# Patient Record
Sex: MALE | Race: WHITE | NOT HISPANIC OR LATINO
[De-identification: names, ages, dates, MRNs, and addresses within clinical notes are randomized per-mention and may not be internally consistent; named-entity substitution may affect disease eponyms.]

---

## 2023-02-07 PROBLEM — Z00.00 ENCOUNTER FOR PREVENTIVE HEALTH EXAMINATION: Status: ACTIVE | Noted: 2023-02-07

## 2023-02-10 ENCOUNTER — APPOINTMENT (OUTPATIENT)
Dept: RADIATION ONCOLOGY | Facility: CLINIC | Age: 73
End: 2023-02-10
Payer: MEDICARE

## 2023-02-10 ENCOUNTER — NON-APPOINTMENT (OUTPATIENT)
Age: 73
End: 2023-02-10

## 2023-02-10 VITALS
RESPIRATION RATE: 19 BRPM | OXYGEN SATURATION: 95 % | WEIGHT: 233 LBS | HEART RATE: 70 BPM | SYSTOLIC BLOOD PRESSURE: 132 MMHG | DIASTOLIC BLOOD PRESSURE: 74 MMHG

## 2023-02-10 DIAGNOSIS — Z87.438 PERSONAL HISTORY OF OTHER DISEASES OF MALE GENITAL ORGANS: ICD-10-CM

## 2023-02-10 DIAGNOSIS — Z80.8 FAMILY HISTORY OF MALIGNANT NEOPLASM OF OTHER ORGANS OR SYSTEMS: ICD-10-CM

## 2023-02-10 DIAGNOSIS — I10 ESSENTIAL (PRIMARY) HYPERTENSION: ICD-10-CM

## 2023-02-10 DIAGNOSIS — R97.20 ELEVATED PROSTATE, SPECIFIC ANTIGEN [PSA]: ICD-10-CM

## 2023-02-10 DIAGNOSIS — C61 MALIGNANT NEOPLASM OF PROSTATE: ICD-10-CM

## 2023-02-10 DIAGNOSIS — E78.00 PURE HYPERCHOLESTEROLEMIA, UNSPECIFIED: ICD-10-CM

## 2023-02-10 PROCEDURE — 99204 OFFICE O/P NEW MOD 45 MIN: CPT | Mod: 25

## 2023-02-10 RX ORDER — HYDROCHLOROTHIAZIDE 12.5 MG/1
12.5 CAPSULE ORAL
Refills: 0 | Status: ACTIVE | COMMUNITY

## 2023-02-10 RX ORDER — AMLODIPINE BESYLATE 10 MG/1
10 TABLET ORAL
Refills: 0 | Status: ACTIVE | COMMUNITY

## 2023-02-10 RX ORDER — SILODOSIN 8 MG/1
8 CAPSULE ORAL
Refills: 0 | Status: ACTIVE | COMMUNITY

## 2023-02-10 NOTE — PHYSICAL EXAM
[Normal] : oriented to person, place and time, the affect was normal, the mood was normal and not anxious [de-identified] : Large midline ventral abdominal hernia [FreeTextEntry1] : Declined by patietn

## 2023-02-10 NOTE — HISTORY OF PRESENT ILLNESS
[FreeTextEntry1] : This is a 72 year old male diagnosed with prostate cancer in September 2022 referred by Dr. Lui. \par \par Elevated PSA in February 2019 noted to be 5.49 ng/mL. Prostate MRI performed on 2/5/19 showed a 66 cc. Lesion noted 10 x 5 mm right posteromedial midgland peripheral zone lesion. Overlying capsular irregularity and bulging likely representing extra capsular extension. PI-RADS 4. Also seen was a 10 x 9 mm right anterior base (extending to midgland) transition zone lesion. Abuts the capsule but not extracapsular extension.  PI-RADS 3. No evidence of seminal vesicle invasion.No new or enlarging pelvic lymph nodes. Large umbilical hernia containing fat and non obstructed small bowel loops with 6 cm neck. \par \par Patient refused prostate biopsy at this time. He was started on Rapaflo. \par \par PSA on 2/25/21 was 10.10 ng/mL\par PSA on 3/10/22 was 15.1 ng/mL\par \par Prostate biopsy performed on 9/6/22 showed adenocarcinoma in 1 of 11 cores. [place] score of 3+4=7. Maximum core involvement 50%. \par \par PSA on 10/3/22 was 14.4 ng/mL.\par \par CT A/P performed on 10/11/22 showed a heterogenous, enlarged prostate gland with nodular indentation of the bladder trigone. No abdominal or pelvic lymphadenopathy. Large supraumbilical hernia containing unobstructed small bowel and mesentery. Stable right adrenal nodule, unchanged since 2015 and presumably a benign adrenal adenoma. Mild mesenteric and retroperitoneal fat stranding, nonspecific but unchanged since 2015 and presumably benign. \par \par Urinary frequency and urgency noted. Denies hematuria and dysuria. He continues to take Rapaflo. His EPIC score is 18.  PSA drawn today.  He presents to discuss the role of radiation therapy in his care.

## 2023-02-10 NOTE — DISEASE MANAGEMENT
[3] : T3 [0] : M0 [0-10] : 0 -10 ng/mL [Biopsy] : Patient had a biopsy on [7(3+4)] : Template Biopsy Galt Score: 7(3+4) [] : Patient had a Prostate MRI [4] : 4 [Extracapsular Extension] : Extracapsular extension [IIIB] : IIIB [BiopsyDate] : 09/22 [TotalCores] : 11 [TotalPositiveCores] : 1 [CTresults] : negative

## 2023-02-10 NOTE — VITALS
[Maximal Pain Intensity: 0/10] : 0/10 [90: Able to carry normal activity; minor signs or symptoms of disease.] : 90: Able to carry normal activity; minor signs or symptoms of disease.  [Date: ____________] : Patient's last distress assessment performed on [unfilled]. [3 - Distress Level] : Distress Level: 3 [Referred Patient  to social work for follow-up] : Patient was referred to social work for follow-up [FreeTextEntry7] : Patient very tearful during consult.

## 2023-02-13 LAB — PSA SERPL-MCNC: 14.4 NG/ML

## 2023-02-17 PROCEDURE — 77332 RADIATION TREATMENT AID(S): CPT

## 2023-02-17 PROCEDURE — 77263 THER RADIOLOGY TX PLNG CPLX: CPT

## 2023-02-24 PROCEDURE — 77301 RADIOTHERAPY DOSE PLAN IMRT: CPT

## 2023-02-24 PROCEDURE — 77338 DESIGN MLC DEVICE FOR IMRT: CPT

## 2023-02-24 PROCEDURE — 77300 RADIATION THERAPY DOSE PLAN: CPT

## 2023-04-24 ENCOUNTER — NON-APPOINTMENT (OUTPATIENT)
Age: 73
End: 2023-04-24

## 2023-04-24 VITALS — OXYGEN SATURATION: 97 % | RESPIRATION RATE: 18 BRPM | WEIGHT: 240 LBS | HEART RATE: 64 BPM

## 2023-04-24 PROCEDURE — 77014: CPT

## 2023-04-24 PROCEDURE — 77427 RADIATION TX MANAGEMENT X5: CPT

## 2023-04-24 PROCEDURE — G6015: CPT

## 2023-04-24 NOTE — DISEASE MANAGEMENT
[Pathological] : TNM Stage: p [IIIB] : IIIB [TTNM] : 3 [NTNM] : 0 [MTNM] : 0 [de-identified] : 250 [de-identified] : 8306 [de-identified] : Prostate/ SV

## 2023-04-24 NOTE — HISTORY OF PRESENT ILLNESS
[FreeTextEntry1] : This is a 72 year old male diagnosed with prostate cancer in September 2022 referred by Dr. Lui. \par \par Elevated PSA in February 2019 noted to be 5.49 ng/mL. Prostate MRI performed on 2/5/19 showed a 66 cc. Lesion noted 10 x 5 mm right posteromedial midgland peripheral zone lesion. Overlying capsular irregularity and bulging likely representing extra capsular extension. PI-RADS 4. Also seen was a 10 x 9 mm right anterior base (extending to midgland) transition zone lesion. Abuts the capsule but not extracapsular extension.  PI-RADS 3. No evidence of seminal vesicle invasion.No new or enlarging pelvic lymph nodes. Large umbilical hernia containing fat and non obstructed small bowel loops with 6 cm neck. \par \par Patient refused prostate biopsy at this time. He was started on Rapaflo. \par \par PSA on 2/25/21 was 10.10 ng/mL\par PSA on 3/10/22 was 15.1 ng/mL\par \par Prostate biopsy performed on 9/6/22 showed adenocarcinoma in 1 of 11 cores. [place] score of 3+4=7. Maximum core involvement 50%. \par \par PSA on 10/3/22 was 14.4 ng/mL.\par \par CT A/P performed on 10/11/22 showed a heterogenous, enlarged prostate gland with nodular indentation of the bladder trigone. No abdominal or pelvic lymphadenopathy. Large supraumbilical hernia containing unobstructed small bowel and mesentery. Stable right adrenal nodule, unchanged since 2015 and presumably a benign adrenal adenoma. Mild mesenteric and retroperitoneal fat stranding, nonspecific but unchanged since 2015 and presumably benign. \par \par Urinary frequency and urgency noted. Denies hematuria and dysuria. He continues to take Rapaflo. His EPIC score is 18. \par \par He is currently undergoing RT to the prostate/SV.\par \par He presents for an OTV 1/28 fx. Feeling well.

## 2023-04-25 PROCEDURE — G6015: CPT

## 2023-04-25 PROCEDURE — 77014: CPT

## 2023-04-26 PROCEDURE — G6015: CPT

## 2023-04-26 PROCEDURE — 77014: CPT

## 2023-04-27 PROCEDURE — 77014: CPT

## 2023-04-27 PROCEDURE — G6015: CPT

## 2023-04-28 PROCEDURE — 77014: CPT

## 2023-04-28 PROCEDURE — 77336 RADIATION PHYSICS CONSULT: CPT

## 2023-04-28 PROCEDURE — G6015: CPT

## 2023-05-01 ENCOUNTER — NON-APPOINTMENT (OUTPATIENT)
Age: 73
End: 2023-05-01

## 2023-05-01 VITALS — HEART RATE: 67 BPM | WEIGHT: 239 LBS | OXYGEN SATURATION: 97 % | RESPIRATION RATE: 18 BRPM

## 2023-05-01 PROCEDURE — 77427 RADIATION TX MANAGEMENT X5: CPT

## 2023-05-01 PROCEDURE — G6015: CPT

## 2023-05-01 PROCEDURE — 77014: CPT

## 2023-05-02 PROCEDURE — G6015: CPT

## 2023-05-02 PROCEDURE — 77014: CPT

## 2023-05-03 ENCOUNTER — NON-APPOINTMENT (OUTPATIENT)
Age: 73
End: 2023-05-03

## 2023-05-03 PROCEDURE — G6002: CPT

## 2023-05-03 PROCEDURE — G6015: CPT

## 2023-05-03 NOTE — HISTORY OF PRESENT ILLNESS
[FreeTextEntry1] : This is a 72 year old male diagnosed with prostate cancer in September 2022 referred by Dr. Lui. \par \par Elevated PSA in February 2019 noted to be 5.49 ng/mL. Prostate MRI performed on 2/5/19 showed a 66 cc. Lesion noted 10 x 5 mm right posteromedial midgland peripheral zone lesion. Overlying capsular irregularity and bulging likely representing extra capsular extension. PI-RADS 4. Also seen was a 10 x 9 mm right anterior base (extending to midgland) transition zone lesion. Abuts the capsule but not extracapsular extension.  PI-RADS 3. No evidence of seminal vesicle invasion.No new or enlarging pelvic lymph nodes. Large umbilical hernia containing fat and non obstructed small bowel loops with 6 cm neck. \par \par Patient refused prostate biopsy at this time. He was started on Rapaflo. \par \par PSA on 2/25/21 was 10.10 ng/mL\par PSA on 3/10/22 was 15.1 ng/mL\par \par Prostate biopsy performed on 9/6/22 showed adenocarcinoma in 1 of 11 cores. [place] score of 3+4=7. Maximum core involvement 50%. \par \par PSA on 10/3/22 was 14.4 ng/mL.\par \par CT A/P performed on 10/11/22 showed a heterogenous, enlarged prostate gland with nodular indentation of the bladder trigone. No abdominal or pelvic lymphadenopathy. Large supraumbilical hernia containing unobstructed small bowel and mesentery. Stable right adrenal nodule, unchanged since 2015 and presumably a benign adrenal adenoma. Mild mesenteric and retroperitoneal fat stranding, nonspecific but unchanged since 2015 and presumably benign. \par \par Urinary frequency and urgency noted. Denies hematuria and dysuria. He continues to take Rapaflo. His EPIC score is 18. \par \par He is currently undergoing RT to the prostate/SV.\par \par He presents for an OTV 6/28 fx. Feeling well. Denies any current symptoms.

## 2023-05-03 NOTE — DISEASE MANAGEMENT
[TTNM] : 3 [NTNM] : 0 [MTNM] : 0 [de-identified] : 3669 [de-identified] : 4766 [de-identified] : Prostate/ SV

## 2023-05-04 PROCEDURE — 77014: CPT

## 2023-05-04 PROCEDURE — G6015: CPT

## 2023-05-05 PROCEDURE — G6015: CPT

## 2023-05-05 PROCEDURE — 77014: CPT

## 2023-05-08 ENCOUNTER — NON-APPOINTMENT (OUTPATIENT)
Age: 73
End: 2023-05-08

## 2023-05-08 VITALS — WEIGHT: 243 LBS | RESPIRATION RATE: 19 BRPM | OXYGEN SATURATION: 98 % | HEART RATE: 70 BPM

## 2023-05-08 PROCEDURE — G6015: CPT

## 2023-05-08 PROCEDURE — G6002: CPT

## 2023-05-08 PROCEDURE — 77427 RADIATION TX MANAGEMENT X5: CPT

## 2023-05-08 NOTE — DISEASE MANAGEMENT
[Pathological] : TNM Stage: p [IIIB] : IIIB [TTNM] : 3 [NTNM] : 0 [MTNM] : 0 [de-identified] : 9295 [de-identified] : 1765 [de-identified] : Prostate/ SV

## 2023-05-08 NOTE — HISTORY OF PRESENT ILLNESS
[FreeTextEntry1] : This is a 72 year old male diagnosed with prostate cancer in September 2022 referred by Dr. Lui. \par \par Elevated PSA in February 2019 noted to be 5.49 ng/mL. Prostate MRI performed on 2/5/19 showed a 66 cc. Lesion noted 10 x 5 mm right posteromedial midgland peripheral zone lesion. Overlying capsular irregularity and bulging likely representing extra capsular extension. PI-RADS 4. Also seen was a 10 x 9 mm right anterior base (extending to midgland) transition zone lesion. Abuts the capsule but not extracapsular extension.  PI-RADS 3. No evidence of seminal vesicle invasion.No new or enlarging pelvic lymph nodes. Large umbilical hernia containing fat and non obstructed small bowel loops with 6 cm neck. \par \par Patient refused prostate biopsy at this time. He was started on Rapaflo. \par \par PSA on 2/25/21 was 10.10 ng/mL\par PSA on 3/10/22 was 15.1 ng/mL\par \par Prostate biopsy performed on 9/6/22 showed adenocarcinoma in 1 of 11 cores. Steele score of 3+4=7. Maximum core involvement 50%. \par \par PSA on 10/3/22 was 14.4 ng/mL.\par \par CT A/P performed on 10/11/22 showed a heterogenous, enlarged prostate gland with nodular indentation of the bladder trigone. No abdominal or pelvic lymphadenopathy. Large supraumbilical hernia containing unobstructed small bowel and mesentery. Stable right adrenal nodule, unchanged since 2015 and presumably a benign adrenal adenoma. Mild mesenteric and retroperitoneal fat stranding, nonspecific but unchanged since 2015 and presumably benign. \par \par Urinary frequency and urgency noted. Denies hematuria and dysuria. He continues to take Rapaflo. His EPIC score is 18. \par \par He is currently undergoing RT to the prostate/SV.\par \par He presents for an OTV 11/28 fx. Feeling well. Denies any current symptoms.

## 2023-05-09 PROCEDURE — G6015: CPT

## 2023-05-09 PROCEDURE — 77014: CPT

## 2023-05-10 PROCEDURE — 77014: CPT

## 2023-05-10 PROCEDURE — G6015: CPT

## 2023-05-11 PROCEDURE — G6015: CPT

## 2023-05-11 PROCEDURE — 77014: CPT

## 2023-05-12 PROCEDURE — 77336 RADIATION PHYSICS CONSULT: CPT

## 2023-05-12 PROCEDURE — G6015: CPT

## 2023-05-12 PROCEDURE — 77014: CPT

## 2023-05-15 ENCOUNTER — NON-APPOINTMENT (OUTPATIENT)
Age: 73
End: 2023-05-15

## 2023-05-15 PROCEDURE — 77427 RADIATION TX MANAGEMENT X5: CPT

## 2023-05-15 PROCEDURE — G6002: CPT

## 2023-05-15 PROCEDURE — G6015: CPT

## 2023-05-15 NOTE — HISTORY OF PRESENT ILLNESS
[FreeTextEntry1] : This is a 72 year old male diagnosed with prostate cancer in September 2022 referred by Dr. Lui. \par \par Elevated PSA in February 2019 noted to be 5.49 ng/mL. Prostate MRI performed on 2/5/19 showed a 66 cc. Lesion noted 10 x 5 mm right posteromedial midgland peripheral zone lesion. Overlying capsular irregularity and bulging likely representing extra capsular extension. PI-RADS 4. Also seen was a 10 x 9 mm right anterior base (extending to midgland) transition zone lesion. Abuts the capsule but not extracapsular extension.  PI-RADS 3. No evidence of seminal vesicle invasion.No new or enlarging pelvic lymph nodes. Large umbilical hernia containing fat and non obstructed small bowel loops with 6 cm neck. \par \par Patient refused prostate biopsy at this time. He was started on Rapaflo. \par \par PSA on 2/25/21 was 10.10 ng/mL\par PSA on 3/10/22 was 15.1 ng/mL\par \par Prostate biopsy performed on 9/6/22 showed adenocarcinoma in 1 of 11 cores. [place] score of 3+4=7. Maximum core involvement 50%. \par \par PSA on 10/3/22 was 14.4 ng/mL.\par \par CT A/P performed on 10/11/22 showed a heterogenous, enlarged prostate gland with nodular indentation of the bladder trigone. No abdominal or pelvic lymphadenopathy. Large supraumbilical hernia containing unobstructed small bowel and mesentery. Stable right adrenal nodule, unchanged since 2015 and presumably a benign adrenal adenoma. Mild mesenteric and retroperitoneal fat stranding, nonspecific but unchanged since 2015 and presumably benign. \par \par Urinary frequency and urgency noted. Denies hematuria and dysuria. He continues to take Rapaflo. His EPIC score is 18. \par \par He is currently undergoing RT to the prostate/SV.\par \par He presents for an OTV 16/28 fx. Feeling well. Denies any current symptoms. States he has been having trouble filling his bladder for treatment.

## 2023-05-15 NOTE — DISEASE MANAGEMENT
[Pathological] : TNM Stage: p [IIIB] : IIIB [TTNM] : 3 [NTNM] : 0 [MTNM] : 0 [de-identified] : 9688 [de-identified] : 9729 [de-identified] : Prostate/ SV

## 2023-05-16 PROCEDURE — G6015: CPT

## 2023-05-16 PROCEDURE — 77014: CPT

## 2023-05-17 PROCEDURE — G6015: CPT

## 2023-05-17 PROCEDURE — 77014: CPT

## 2023-05-18 PROCEDURE — G6015: CPT

## 2023-05-18 PROCEDURE — 77014: CPT

## 2023-05-19 PROCEDURE — G6015: CPT

## 2023-05-19 PROCEDURE — 77336 RADIATION PHYSICS CONSULT: CPT

## 2023-05-19 PROCEDURE — 77014: CPT

## 2023-05-22 ENCOUNTER — NON-APPOINTMENT (OUTPATIENT)
Age: 73
End: 2023-05-22

## 2023-05-23 PROCEDURE — G6015: CPT

## 2023-05-23 PROCEDURE — 77014: CPT

## 2023-05-23 PROCEDURE — 77427 RADIATION TX MANAGEMENT X5: CPT

## 2023-05-24 ENCOUNTER — NON-APPOINTMENT (OUTPATIENT)
Age: 73
End: 2023-05-24

## 2023-05-24 VITALS — WEIGHT: 245 LBS | RESPIRATION RATE: 18 BRPM | OXYGEN SATURATION: 98 % | HEART RATE: 65 BPM

## 2023-05-24 PROCEDURE — G6002: CPT

## 2023-05-24 PROCEDURE — G6015: CPT

## 2023-05-24 NOTE — HISTORY OF PRESENT ILLNESS
[FreeTextEntry1] : This is a 72 year old male diagnosed with prostate cancer in September 2022 referred by Dr. Lui. \par \par Elevated PSA in February 2019 noted to be 5.49 ng/mL. Prostate MRI performed on 2/5/19 showed a 66 cc. Lesion noted 10 x 5 mm right posteromedial midgland peripheral zone lesion. Overlying capsular irregularity and bulging likely representing extra capsular extension. PI-RADS 4. Also seen was a 10 x 9 mm right anterior base (extending to midgland) transition zone lesion. Abuts the capsule but not extracapsular extension.  PI-RADS 3. No evidence of seminal vesicle invasion.No new or enlarging pelvic lymph nodes. Large umbilical hernia containing fat and non obstructed small bowel loops with 6 cm neck. \par \par Patient refused prostate biopsy at this time. He was started on Rapaflo. \par \par PSA on 2/25/21 was 10.10 ng/mL\par PSA on 3/10/22 was 15.1 ng/mL\par \par Prostate biopsy performed on 9/6/22 showed adenocarcinoma in 1 of 11 cores. [place] score of 3+4=7. Maximum core involvement 50%. \par \par PSA on 10/3/22 was 14.4 ng/mL.\par \par CT A/P performed on 10/11/22 showed a heterogenous, enlarged prostate gland with nodular indentation of the bladder trigone. No abdominal or pelvic lymphadenopathy. Large supraumbilical hernia containing unobstructed small bowel and mesentery. Stable right adrenal nodule, unchanged since 2015 and presumably a benign adrenal adenoma. Mild mesenteric and retroperitoneal fat stranding, nonspecific but unchanged since 2015 and presumably benign. \par \par Urinary frequency and urgency noted. Denies hematuria and dysuria. He continues to take Rapaflo. His EPIC score is 18. \par \par He is currently undergoing RT to the prostate/SV.\par \par He presents for an OTV 22/28 fx. Feeling well. Denies any current symptoms. Has some frequency for which he is on Rapaflo. Does note mild fatigue.

## 2023-05-24 NOTE — DISEASE MANAGEMENT
[TTNM] : 3 [NTNM] : 0 [MTNM] : 0 [de-identified] : 0732 [de-identified] : 2053 [de-identified] : Prostate/ SV

## 2023-05-25 PROCEDURE — 77014: CPT

## 2023-05-25 PROCEDURE — G6015: CPT

## 2023-05-26 PROCEDURE — 77014: CPT

## 2023-05-26 PROCEDURE — G6015: CPT

## 2023-05-30 PROCEDURE — 77014: CPT

## 2023-05-30 PROCEDURE — G6015: CPT

## 2023-05-30 PROCEDURE — 77336 RADIATION PHYSICS CONSULT: CPT

## 2023-05-31 ENCOUNTER — NON-APPOINTMENT (OUTPATIENT)
Age: 73
End: 2023-05-31

## 2023-05-31 VITALS
HEIGHT: 68 IN | HEART RATE: 73 BPM | BODY MASS INDEX: 36.98 KG/M2 | OXYGEN SATURATION: 98 % | RESPIRATION RATE: 18 BRPM | WEIGHT: 244 LBS

## 2023-05-31 PROCEDURE — G6015: CPT

## 2023-05-31 PROCEDURE — 77014: CPT

## 2023-05-31 NOTE — DISEASE MANAGEMENT
[Pathological] : TNM Stage: p [IIIB] : IIIB [TTNM] : 3 [NTNM] : 0 [MTNM] : 0 [de-identified] : 2368 [de-identified] : 9174 [de-identified] : Prostate/ SV

## 2023-05-31 NOTE — HISTORY OF PRESENT ILLNESS
[FreeTextEntry1] : This is a 72 year old male diagnosed with prostate cancer in September 2022 referred by Dr. Lui. \par \par Elevated PSA in February 2019 noted to be 5.49 ng/mL. Prostate MRI performed on 2/5/19 showed a 66 cc. Lesion noted 10 x 5 mm right posteromedial midgland peripheral zone lesion. Overlying capsular irregularity and bulging likely representing extra capsular extension. PI-RADS 4. Also seen was a 10 x 9 mm right anterior base (extending to midgland) transition zone lesion. Abuts the capsule but not extracapsular extension.  PI-RADS 3. No evidence of seminal vesicle invasion.No new or enlarging pelvic lymph nodes. Large umbilical hernia containing fat and non obstructed small bowel loops with 6 cm neck. \par \par Patient refused prostate biopsy at this time. He was started on Rapaflo. \par \par PSA on 2/25/21 was 10.10 ng/mL\par PSA on 3/10/22 was 15.1 ng/mL\par \par Prostate biopsy performed on 9/6/22 showed adenocarcinoma in 1 of 11 cores. [place] score of 3+4=7. Maximum core involvement 50%. \par \par PSA on 10/3/22 was 14.4 ng/mL.\par \par CT A/P performed on 10/11/22 showed a heterogenous, enlarged prostate gland with nodular indentation of the bladder trigone. No abdominal or pelvic lymphadenopathy. Large supraumbilical hernia containing unobstructed small bowel and mesentery. Stable right adrenal nodule, unchanged since 2015 and presumably a benign adrenal adenoma. Mild mesenteric and retroperitoneal fat stranding, nonspecific but unchanged since 2015 and presumably benign. \par \par Urinary frequency and urgency noted. Denies hematuria and dysuria. He continues to take Rapaflo. His EPIC score is 18. \par \par He is currently undergoing RT to the prostate/SV.\par \par He presents for an OTV 26/28 fx. Feeling well. Denies any current symptoms.

## 2023-06-01 PROCEDURE — G6002: CPT

## 2023-06-01 PROCEDURE — 77427 RADIATION TX MANAGEMENT X5: CPT

## 2023-06-01 PROCEDURE — G6015: CPT

## 2023-06-02 PROCEDURE — G6015: CPT

## 2023-06-02 PROCEDURE — 77014: CPT

## 2023-06-02 PROCEDURE — 77336 RADIATION PHYSICS CONSULT: CPT

## 2023-07-07 ENCOUNTER — APPOINTMENT (OUTPATIENT)
Dept: RADIATION ONCOLOGY | Facility: CLINIC | Age: 73
End: 2023-07-07
Payer: MEDICARE

## 2023-07-07 ENCOUNTER — NON-APPOINTMENT (OUTPATIENT)
Age: 73
End: 2023-07-07

## 2023-07-07 VITALS
DIASTOLIC BLOOD PRESSURE: 74 MMHG | BODY MASS INDEX: 35.77 KG/M2 | WEIGHT: 236 LBS | OXYGEN SATURATION: 96 % | HEART RATE: 70 BPM | HEIGHT: 68 IN | SYSTOLIC BLOOD PRESSURE: 170 MMHG | RESPIRATION RATE: 21 BRPM

## 2023-07-07 PROCEDURE — 99024 POSTOP FOLLOW-UP VISIT: CPT

## 2023-07-07 NOTE — HISTORY OF PRESENT ILLNESS
[FreeTextEntry1] : This is a 72 year old male diagnosed with prostate cancer in September 2022 referred by Dr. Lui. \par \par Elevated PSA in February 2019 noted to be 5.49 ng/mL. Prostate MRI performed on 2/5/19 showed a 66 cc. Lesion noted 10 x 5 mm right posteromedial midgland peripheral zone lesion. Overlying capsular irregularity and bulging likely representing extra capsular extension. PI-RADS 4. Also seen was a 10 x 9 mm right anterior base (extending to midgland) transition zone lesion. Abuts the capsule but not extracapsular extension.  PI-RADS 3. No evidence of seminal vesicle invasion.No new or enlarging pelvic lymph nodes. Large umbilical hernia containing fat and non obstructed small bowel loops with 6 cm neck. \par \par Patient refused prostate biopsy at this time. He was started on Rapaflo. \par \par PSA on 2/25/21 was 10.10 ng/mL\par PSA on 3/10/22 was 15.1 ng/mL\par \par Prostate biopsy performed on 9/6/22 showed adenocarcinoma in 1 of 11 cores. [place] score of 3+4=7. Maximum core involvement 50%. \par \par PSA on 10/3/22 was 14.4 ng/mL.\par \par CT A/P performed on 10/11/22 showed a heterogenous, enlarged prostate gland with nodular indentation of the bladder trigone. No abdominal or pelvic lymphadenopathy. Large supraumbilical hernia containing unobstructed small bowel and mesentery. Stable right adrenal nodule, unchanged since 2015 and presumably a benign adrenal adenoma. Mild mesenteric and retroperitoneal fat stranding, nonspecific but unchanged since 2015 and presumably benign. \par \par Urinary frequency and urgency noted. Denies hematuria and dysuria. He continues to take Rapaflo. His EPIC score is 18. \par \par He completed RT to the prostate/SV, total dose of 7000 cGy in 28 fx, on 6/2/23. \par \par He presents for a one month PTE. Feeling well. Denies any current symptoms.

## 2023-07-07 NOTE — DISEASE MANAGEMENT
[BiopsyDate] : 09/22 [MeasuredProstateVolume] : 66 [TotalCores] : 11 [TotalPositiveCores] : 1 [MaxCoreInvolvement] : 50 [RadiationCompletedDate] : 06/23 [EBRTDose] : 4925 [EBRTFractions] : 28 [TTNM] : 3 [NTNM] : 0 [MTNM] : 0

## 2023-07-10 LAB — PSA SERPL-MCNC: 6.94 NG/ML

## 2023-10-06 ENCOUNTER — APPOINTMENT (OUTPATIENT)
Dept: RADIATION ONCOLOGY | Facility: CLINIC | Age: 73
End: 2023-10-06
Payer: MEDICARE

## 2023-10-06 VITALS
BODY MASS INDEX: 36.53 KG/M2 | DIASTOLIC BLOOD PRESSURE: 80 MMHG | SYSTOLIC BLOOD PRESSURE: 144 MMHG | WEIGHT: 241 LBS | HEIGHT: 68 IN

## 2023-10-06 PROCEDURE — 99214 OFFICE O/P EST MOD 30 MIN: CPT

## 2023-10-09 LAB — PSA SERPL-MCNC: 3.46 NG/ML

## 2024-04-08 ENCOUNTER — NON-APPOINTMENT (OUTPATIENT)
Age: 74
End: 2024-04-08

## 2024-04-08 ENCOUNTER — APPOINTMENT (OUTPATIENT)
Dept: RADIATION ONCOLOGY | Facility: CLINIC | Age: 74
End: 2024-04-08
Payer: MEDICARE

## 2024-04-08 VITALS
HEIGHT: 68 IN | RESPIRATION RATE: 18 BRPM | HEART RATE: 54 BPM | SYSTOLIC BLOOD PRESSURE: 140 MMHG | DIASTOLIC BLOOD PRESSURE: 72 MMHG | WEIGHT: 238 LBS | BODY MASS INDEX: 36.07 KG/M2 | OXYGEN SATURATION: 95 %

## 2024-04-08 PROCEDURE — 99213 OFFICE O/P EST LOW 20 MIN: CPT

## 2024-04-08 NOTE — DISEASE MANAGEMENT
[BiopsyDate] : 09/22 [MeasuredProstateVolume] : 66 [TotalCores] : 11 [TotalPositiveCores] : 1 [MaxCoreInvolvement] : 50

## 2024-04-08 NOTE — HISTORY OF PRESENT ILLNESS
[FreeTextEntry1] : This is a 73 year old male diagnosed with prostate cancer in September 2022 referred by Dr. Lui.   Elevated PSA in February 2019 noted to be 5.49 ng/mL. Prostate MRI performed on 2/5/19 showed a 66 cc. Lesion noted 10 x 5 mm right posteromedial midgland peripheral zone lesion. Overlying capsular irregularity and bulging likely representing extra capsular extension. PI-RADS 4. Also seen was a 10 x 9 mm right anterior base (extending to midgland) transition zone lesion. Abuts the capsule but not extracapsular extension.  PI-RADS 3. No evidence of seminal vesicle invasion.No new or enlarging pelvic lymph nodes. Large umbilical hernia containing fat and non obstructed small bowel loops with 6 cm neck.   Patient refused prostate biopsy at this time. He was started on Rapaflo.   PSA on 2/25/21 was 10.10 ng/mL PSA on 3/10/22 was 15.1 ng/mL  Prostate biopsy performed on 9/6/22 showed adenocarcinoma in 1 of 11 cores. [place] score of 3+4=7. Maximum core involvement 50%.   PSA on 10/3/22 was 14.4 ng/mL.  CT A/P performed on 10/11/22 showed a heterogenous, enlarged prostate gland with nodular indentation of the bladder trigone. No abdominal or pelvic lymphadenopathy. Large supraumbilical hernia containing unobstructed small bowel and mesentery. Stable right adrenal nodule, unchanged since 2015 and presumably a benign adrenal adenoma. Mild mesenteric and retroperitoneal fat stranding, nonspecific but unchanged since 2015 and presumably benign.   Urinary frequency and urgency noted. Denies hematuria and dysuria. He continues to take Rapaflo. His EPIC score is 18.   He was assessed to have prostate adenoca, stage [id number], PSA 14.4, Ania 7 (3+4), 1 of 11 biopsy cores positive. MRI shows extracapsular extension at right posteromedial midgland peripheral zone. Patietn has had elevation in PSA and MRI abnormality (PIRADS-4) since 2019 but refused biopsy until September 2022. He is refusing a repeat MRI scan now.  He completed RT to the prostate/SV, total dose of 7000 cGy in 28 fx, on 6/2/23.   PSA on 7/7/23 was 6.94 PSA on 10/6/23 was 3.46  He presents for a 10 month follow up. Feeling well. Urinary urgency and incontinence noted, but much improved.. PSA drawn today.

## 2024-04-10 LAB — PSA SERPL-MCNC: 1.51 NG/ML

## 2024-10-16 ENCOUNTER — APPOINTMENT (OUTPATIENT)
Dept: RADIATION ONCOLOGY | Facility: CLINIC | Age: 74
End: 2024-10-16
Payer: MEDICARE

## 2024-10-16 VITALS
DIASTOLIC BLOOD PRESSURE: 78 MMHG | SYSTOLIC BLOOD PRESSURE: 156 MMHG | BODY MASS INDEX: 35.77 KG/M2 | OXYGEN SATURATION: 98 % | RESPIRATION RATE: 16 BRPM | WEIGHT: 236 LBS | HEIGHT: 68 IN | HEART RATE: 66 BPM

## 2024-10-16 PROCEDURE — 99213 OFFICE O/P EST LOW 20 MIN: CPT

## 2024-10-16 PROCEDURE — G2211 COMPLEX E/M VISIT ADD ON: CPT

## 2024-10-16 NOTE — HISTORY OF PRESENT ILLNESS
[FreeTextEntry1] : This is a 73 year old male diagnosed with prostate cancer in September 2022 referred by Dr. Ledezma.   Elevated PSA in February 2019 noted to be 5.49 ng/mL. Prostate MRI performed on 2/5/19 showed a 66 cc. Lesion noted 10 x 5 mm right posteromedial midgland peripheral zone lesion. Overlying capsular irregularity and bulging likely representing extra capsular extension. PI-RADS 4. Also seen was a 10 x 9 mm right anterior base (extending to midgland) transition zone lesion. Abuts the capsule but not extracapsular extension.  PI-RADS 3. No evidence of seminal vesicle invasion.No new or enlarging pelvic lymph nodes. Large umbilical hernia containing fat and non obstructed small bowel loops with 6 cm neck.   Patient refused prostate biopsy at this time. He was started on Rapaflo.   PSA on 2/25/21 was 10.10 ng/mL PSA on 3/10/22 was 15.1 ng/mL  Prostate biopsy performed on 9/6/22 showed adenocarcinoma in 1 of 11 cores. [place] score of 3+4=7. Maximum core involvement 50%.   PSA on 10/3/22 was 14.4 ng/mL.  CT A/P performed on 10/11/22 showed a heterogenous, enlarged prostate gland with nodular indentation of the bladder trigone. No abdominal or pelvic lymphadenopathy. Large supraumbilical hernia containing unobstructed small bowel and mesentery. Stable right adrenal nodule, unchanged since 2015 and presumably a benign adrenal adenoma. Mild mesenteric and retroperitoneal fat stranding, nonspecific but unchanged since 2015 and presumably benign.   Urinary frequency and urgency noted. Denies hematuria and dysuria. He continues to take Rapaflo. His EPIC score is 18.   He was assessed to have prostate adenoca, stage [id number], PSA 14.4, Aubrie 7 (3+4), 1 of 11 biopsy cores positive. MRI shows extracapsular extension at right posteromedial midgland peripheral zone. Patient has had elevation in PSA and MRI abnormality (PIRADS-4) since 2019 but refused biopsy until September 2022. He is refusing a repeat MRI scan now.  He completed RT to the prostate/SV, total dose of 7000 cGy in 28 fx, on 6/2/23.   PSA on 7/7/23 was 6.94 PSA on 10/6/23 was 3.46 PSA 4/8/2024 was 1.51  10/16/24  Presents for f/u.  Today he continues to note urgency and frequency which are improved from during treatment. Stream is improved from prior. He is taking Rapaflo (silodosin).  Denies any dysuria.  Reports good energy level.

## 2024-10-16 NOTE — PHYSICAL EXAM
[Normal] : oriented to person, place and time, the affect was normal, the mood was normal and not anxious [Abdomen Soft] : soft [Abdomen Tenderness] : non-tender [de-identified] : ventral hernia

## 2024-10-16 NOTE — HISTORY OF PRESENT ILLNESS
[FreeTextEntry1] : This is a 73 year old male diagnosed with prostate cancer in September 2022 referred by Dr. Ledezma.   Elevated PSA in February 2019 noted to be 5.49 ng/mL. Prostate MRI performed on 2/5/19 showed a 66 cc. Lesion noted 10 x 5 mm right posteromedial midgland peripheral zone lesion. Overlying capsular irregularity and bulging likely representing extra capsular extension. PI-RADS 4. Also seen was a 10 x 9 mm right anterior base (extending to midgland) transition zone lesion. Abuts the capsule but not extracapsular extension.  PI-RADS 3. No evidence of seminal vesicle invasion.No new or enlarging pelvic lymph nodes. Large umbilical hernia containing fat and non obstructed small bowel loops with 6 cm neck.   Patient refused prostate biopsy at this time. He was started on Rapaflo.   PSA on 2/25/21 was 10.10 ng/mL PSA on 3/10/22 was 15.1 ng/mL  Prostate biopsy performed on 9/6/22 showed adenocarcinoma in 1 of 11 cores. Aubrie score of 3+4=7. Maximum core involvement 50%.   PSA on 10/3/22 was 14.4 ng/mL.  CT A/P performed on 10/11/22 showed a heterogenous, enlarged prostate gland with nodular indentation of the bladder trigone. No abdominal or pelvic lymphadenopathy. Large supraumbilical hernia containing unobstructed small bowel and mesentery. Stable right adrenal nodule, unchanged since 2015 and presumably a benign adrenal adenoma. Mild mesenteric and retroperitoneal fat stranding, nonspecific but unchanged since 2015 and presumably benign.   Urinary frequency and urgency noted. Denies hematuria and dysuria. He continues to take Rapaflo. His EPIC score is 18.   He was assessed to have prostate adenoca, stage [id number], PSA 14.4, Aubrie 7 (3+4), 1 of 11 biopsy cores positive. MRI shows extracapsular extension at right posteromedial midgland peripheral zone. Patient has had elevation in PSA and MRI abnormality (PIRADS-4) since 2019 but refused biopsy until September 2022. He is refusing a repeat MRI scan now.  He completed RT to the prostate/SV, total dose of 7000 cGy in 28 fx, on 6/2/23.   PSA on 7/7/23 was 6.94 PSA on 10/6/23 was 3.46 PSA 4/8/2024 was 1.51  10/16/24  Presents for f/u.  Today he continues to note urgency and frequency which are improved from during treatment. Stream is improved from prior. He is taking Rapaflo (silodosin).  Denies any dysuria.  Reports good energy level.

## 2024-10-16 NOTE — DISEASE MANAGEMENT
[3] : T3 [0] : M0 [10-20] : 10 - 20 ng/mL [Biopsy] : Patient had a biopsy on [7(3+4)] : Template Biopsy [place] Score: 7(3+4) [] : Patient had a Prostate MRI [IIIB] : IIIB [BiopsyDate] : 09/22 [MeasuredProstateVolume] : 66 [TotalCores] : 11 [TotalPositiveCores] : 1 [MaxCoreInvolvement] : 50

## 2024-10-16 NOTE — REVIEW OF SYSTEMS
[Hematuria: Grade 0] : Hematuria: Grade 0 [Urinary Incontinence: Grade 0] : Urinary Incontinence: Grade 0  [Urinary Retention: Grade 0] : Urinary Retention: Grade 0 [Urinary Tract Pain: Grade 0] : Urinary Tract Pain: Grade 0 [Urinary Urgency: Grade 0] : Urinary Urgency: Grade 0 [Urinary Frequency: Grade 1 - Present] : Urinary Frequency: Grade 1 - Present [Negative] : Neurological [Urinary Urgency: Grade 1 - Present] : Urinary Urgency: Grade 1 - Present [FreeTextEntry8] : notes frequency and urgency which are improved.

## 2024-10-16 NOTE — PHYSICAL EXAM
[Normal] : oriented to person, place and time, the affect was normal, the mood was normal and not anxious [Abdomen Soft] : soft [Abdomen Tenderness] : non-tender [de-identified] : ventral hernia

## 2024-10-16 NOTE — DISEASE MANAGEMENT
[3] : T3 [0] : M0 [10-20] : 10 - 20 ng/mL [Biopsy] : Patient had a biopsy on [7(3+4)] : Template Biopsy [place] Score: 7(3+4) [] : Patient had a Prostate MRI [IIIB] : IIIB [BiopsyDate] : 09/22 [MeasuredProstateVolume] : 66 [TotalCores] : 11 [TotalPositiveCores] : 1 [MaxCoreInvolvement] : 50

## 2024-10-17 LAB — PSA SERPL-MCNC: 0.84 NG/ML

## 2025-04-22 NOTE — HISTORY OF PRESENT ILLNESS
[FreeTextEntry1] : This is a 73 year old male diagnosed with prostate cancer in September 2022 referred by Dr. Ledezma.   Elevated PSA in February 2019 noted to be 5.49 ng/mL. Prostate MRI performed on 2/5/19 showed a 66 cc. Lesion noted 10 x 5 mm right posteromedial midgland peripheral zone lesion. Overlying capsular irregularity and bulging likely representing extra capsular extension. PI-RADS 4. Also seen was a 10 x 9 mm right anterior base (extending to midgland) transition zone lesion. Abuts the capsule but not extracapsular extension.  PI-RADS 3. No evidence of seminal vesicle invasion.No new or enlarging pelvic lymph nodes. Large umbilical hernia containing fat and non obstructed small bowel loops with 6 cm neck.   Patient refused prostate biopsy at this time. He was started on Rapaflo.   PSA on 2/25/21 was 10.10 ng/mL PSA on 3/10/22 was 15.1 ng/mL  Prostate biopsy performed on 9/6/22 showed adenocarcinoma in 1 of 11 cores. Aubrie score of 3+4=7. Maximum core involvement 50%.   PSA on 10/3/22 was 14.4 ng/mL.  CT A/P performed on 10/11/22 showed a heterogenous, enlarged prostate gland with nodular indentation of the bladder trigone. No abdominal or pelvic lymphadenopathy. Large supraumbilical hernia containing unobstructed small bowel and mesentery. Stable right adrenal nodule, unchanged since 2015 and presumably a benign adrenal adenoma. Mild mesenteric and retroperitoneal fat stranding, nonspecific but unchanged since 2015 and presumably benign.   Urinary frequency and urgency noted. Denies hematuria and dysuria. He continues to take Rapaflo. His EPIC score is 18.   He was assessed to have prostate adenoca, stage [id number], PSA 14.4, Aubrie 7 (3+4), 1 of 11 biopsy cores positive. MRI shows extracapsular extension at right posteromedial midgland peripheral zone. Patient has had elevation in PSA and MRI abnormality (PIRADS-4) since 2019 but refused biopsy until September 2022. He is refusing a repeat MRI scan now.  He completed RT to the prostate/SV, total dose of 7000 cGy in 28 fx, on 6/2/23.   PSA on 7/7/23 was 6.94 PSA on 10/6/23 was 3.46  <---RT finished 6/2/23 PSA 4/8/2024 was 1.51 PSA 2/13/25 was 0.55  10/16/24 Presents for f/u.  Today he continues to note urgency and frequency which are improved from during treatment. Stream is improved from prior. He is taking Rapaflo (silodosin).  Denies any dysuria.  Reports good energy level.  He saw Dr. Ledezma for a follow up on 2/12/25. Will see him next year again.   04/23/2025 Patient presents for a 1 year 10 month follow up visit since completing radiation therapy. He is no longer experiencing urinary urgency, frequency or slow stream. He denies dysuria, hematuria, incontinence, nocturia. Continues to take Silodosin. Maintains good energy levels and no fatigue.

## 2025-04-22 NOTE — HISTORY OF PRESENT ILLNESS
[FreeTextEntry1] : This is a 73 year old male diagnosed with prostate cancer in September 2022 referred by Dr. Ledezma.   Elevated PSA in February 2019 noted to be 5.49 ng/mL. Prostate MRI performed on 2/5/19 showed a 66 cc. Lesion noted 10 x 5 mm right posteromedial midgland peripheral zone lesion. Overlying capsular irregularity and bulging likely representing extra capsular extension. PI-RADS 4. Also seen was a 10 x 9 mm right anterior base (extending to midgland) transition zone lesion. Abuts the capsule but not extracapsular extension.  PI-RADS 3. No evidence of seminal vesicle invasion.No new or enlarging pelvic lymph nodes. Large umbilical hernia containing fat and non obstructed small bowel loops with 6 cm neck.   Patient refused prostate biopsy at this time. He was started on Rapaflo.   PSA on 2/25/21 was 10.10 ng/mL PSA on 3/10/22 was 15.1 ng/mL  Prostate biopsy performed on 9/6/22 showed adenocarcinoma in 1 of 11 cores. Aubrie score of 3+4=7. Maximum core involvement 50%.   PSA on 10/3/22 was 14.4 ng/mL.  CT A/P performed on 10/11/22 showed a heterogenous, enlarged prostate gland with nodular indentation of the bladder trigone. No abdominal or pelvic lymphadenopathy. Large supraumbilical hernia containing unobstructed small bowel and mesentery. Stable right adrenal nodule, unchanged since 2015 and presumably a benign adrenal adenoma. Mild mesenteric and retroperitoneal fat stranding, nonspecific but unchanged since 2015 and presumably benign.   Urinary frequency and urgency noted. Denies hematuria and dysuria. He continues to take Rapaflo. His EPIC score is 18.   He was assessed to have prostate adenoca, stage [id number], PSA 14.4, Aubrie 7 (3+4), 1 of 11 biopsy cores positive. MRI shows extracapsular extension at right posteromedial midgland peripheral zone. Patient has had elevation in PSA and MRI abnormality (PIRADS-4) since 2019 but refused biopsy until September 2022. He is refusing a repeat MRI scan now.  He completed RT to the prostate/SV, total dose of 7000 cGy in 28 fx, on 6/2/23.   PSA on 7/7/23 was 6.94 PSA on 10/6/23 was 3.46  <---RT finished 6/2/23 PSA 4/8/2024 was 1.51 PSA 2/13/25 was 0.55  10/16/24 Presents for f/u.  Today he continues to note urgency and frequency which are improved from during treatment. Stream is improved from prior. He is taking Rapaflo (silodosin).  Denies any dysuria.  Reports good energy level.  He saw Dr. Ledezma for a follow up on 2/12/25. Will see him next year again.   04/23/2025 Patient presents for a 1 year 10 month follow up visit since completing radiation therapy. He is no longer experiencing urinary urgency, frequency or slow stream. He denies dysuria, hematuria, incontinence, nocturia. Continues to take Silodosin. Maintains good energy levels and no fatigue.

## 2025-04-23 ENCOUNTER — NON-APPOINTMENT (OUTPATIENT)
Age: 75
End: 2025-04-23

## 2025-04-23 NOTE — HISTORY OF PRESENT ILLNESS
[FreeTextEntry1] : This is a 73 year old male diagnosed with prostate cancer in September 2022 referred by Dr. Ledezma.   Elevated PSA in February 2019 noted to be 5.49 ng/mL. Prostate MRI performed on 2/5/19 showed a 66 cc. Lesion noted 10 x 5 mm right posteromedial midgland peripheral zone lesion. Overlying capsular irregularity and bulging likely representing extra capsular extension. PI-RADS 4. Also seen was a 10 x 9 mm right anterior base (extending to midgland) transition zone lesion. Abuts the capsule but not extracapsular extension.  PI-RADS 3. No evidence of seminal vesicle invasion.No new or enlarging pelvic lymph nodes. Large umbilical hernia containing fat and non obstructed small bowel loops with 6 cm neck.   Patient refused prostate biopsy at this time. He was started on Rapaflo.   PSA on 2/25/21 was 10.10 ng/mL PSA on 3/10/22 was 15.1 ng/mL  Prostate biopsy performed on 9/6/22 showed adenocarcinoma in 1 of 11 cores. Aubrie score of 3+4=7. Maximum core involvement 50%.   PSA on 10/3/22 was 14.4 ng/mL.  CT A/P performed on 10/11/22 showed a heterogenous, enlarged prostate gland with nodular indentation of the bladder trigone. No abdominal or pelvic lymphadenopathy. Large supraumbilical hernia containing unobstructed small bowel and mesentery. Stable right adrenal nodule, unchanged since 2015 and presumably a benign adrenal adenoma. Mild mesenteric and retroperitoneal fat stranding, nonspecific but unchanged since 2015 and presumably benign.   Urinary frequency and urgency noted. Denies hematuria and dysuria. He continues to take Rapaflo. His EPIC score is 18.   He was assessed to have prostate adenoca, stage [id number], PSA 14.4, Aubrie 7 (3+4), 1 of 11 biopsy cores positive. MRI shows extracapsular extension at right posteromedial midgland peripheral zone. Patient has had elevation in PSA and MRI abnormality (PIRADS-4) since 2019 but refused biopsy until September 2022. He is refusing a repeat MRI scan now.  He completed RT to the prostate/SV, total dose of 7000 cGy in 28 fx, on 6/2/23.   PSA on 7/7/23 was 6.94 PSA on 10/6/23 was 3.46  <---RT finished 6/2/23 PSA 4/8/2024 was 1.51 PSA 2/13/25 was 0.55  10/16/24 Presents for f/u.  Today he continues to note urgency and frequency which are improved from during treatment. Stream is improved from prior. He is taking Rapaflo (silodosin).  Denies any dysuria.  Reports good energy level.  He saw Dr. Ledezma for a follow up on 2/12/25. Will see him next year again.   04/23/2025 Patient presents for a 1 year 10 month follow up visit since completing radiation therapy. He is no longer experiencing urinary urgency, frequency or slow stream. He denies dysuria, hematuria, incontinence, nocturia. Continues to take Silodosin. Maintains good energy levels and no fatigue.

## 2025-04-30 ENCOUNTER — APPOINTMENT (OUTPATIENT)
Dept: RADIATION ONCOLOGY | Facility: CLINIC | Age: 75
End: 2025-04-30

## 2025-04-30 ENCOUNTER — NON-APPOINTMENT (OUTPATIENT)
Age: 75
End: 2025-04-30

## 2025-08-13 ENCOUNTER — APPOINTMENT (OUTPATIENT)
Dept: RADIATION ONCOLOGY | Facility: CLINIC | Age: 75
End: 2025-08-13
Payer: MEDICARE

## 2025-08-13 VITALS
HEART RATE: 69 BPM | SYSTOLIC BLOOD PRESSURE: 134 MMHG | BODY MASS INDEX: 35.61 KG/M2 | DIASTOLIC BLOOD PRESSURE: 77 MMHG | WEIGHT: 235 LBS | OXYGEN SATURATION: 97 % | RESPIRATION RATE: 16 BRPM | HEIGHT: 68 IN

## 2025-08-13 PROCEDURE — 99214 OFFICE O/P EST MOD 30 MIN: CPT

## 2025-08-13 PROCEDURE — G2211 COMPLEX E/M VISIT ADD ON: CPT

## 2025-08-14 ENCOUNTER — NON-APPOINTMENT (OUTPATIENT)
Age: 75
End: 2025-08-14

## 2025-08-14 LAB — PSA SERPL-MCNC: 0.34 NG/ML
